# Patient Record
Sex: MALE | Race: BLACK OR AFRICAN AMERICAN | NOT HISPANIC OR LATINO
[De-identification: names, ages, dates, MRNs, and addresses within clinical notes are randomized per-mention and may not be internally consistent; named-entity substitution may affect disease eponyms.]

---

## 2017-01-16 ENCOUNTER — RECORD ABSTRACTING (OUTPATIENT)
Age: 62
End: 2017-01-16

## 2017-01-16 DIAGNOSIS — F19.21 OTHER PSYCHOACTIVE SUBSTANCE DEPENDENCE, IN REMISSION: ICD-10-CM

## 2017-01-16 DIAGNOSIS — Z87.891 PERSONAL HISTORY OF NICOTINE DEPENDENCE: ICD-10-CM

## 2017-01-16 DIAGNOSIS — F25.8 OTHER SCHIZOAFFECTIVE DISORDERS: ICD-10-CM

## 2017-01-16 DIAGNOSIS — Z78.9 OTHER SPECIFIED HEALTH STATUS: ICD-10-CM

## 2017-01-16 DIAGNOSIS — Z87.898 PERSONAL HISTORY OF OTHER SPECIFIED CONDITIONS: ICD-10-CM

## 2017-01-17 ENCOUNTER — APPOINTMENT (OUTPATIENT)
Dept: PULMONOLOGY | Facility: CLINIC | Age: 62
End: 2017-01-17

## 2017-01-17 VITALS — BODY MASS INDEX: 29.1 KG/M2 | WEIGHT: 201 LBS | HEIGHT: 69.5 IN

## 2017-01-17 VITALS — OXYGEN SATURATION: 95 % | SYSTOLIC BLOOD PRESSURE: 138 MMHG | DIASTOLIC BLOOD PRESSURE: 80 MMHG | HEART RATE: 75 BPM

## 2017-01-17 VITALS — RESPIRATION RATE: 16 BRPM

## 2017-01-17 DIAGNOSIS — Z86.79 PERSONAL HISTORY OF OTHER DISEASES OF THE CIRCULATORY SYSTEM: ICD-10-CM

## 2017-01-17 DIAGNOSIS — Z86.19 PERSONAL HISTORY OF OTHER INFECTIOUS AND PARASITIC DISEASES: ICD-10-CM

## 2017-01-17 DIAGNOSIS — Z00.00 ENCOUNTER FOR GENERAL ADULT MEDICAL EXAMINATION W/OUT ABNORMAL FINDINGS: ICD-10-CM

## 2017-01-17 DIAGNOSIS — F17.200 NICOTINE DEPENDENCE, UNSPECIFIED, UNCOMPLICATED: ICD-10-CM

## 2017-01-17 DIAGNOSIS — Z86.59 PERSONAL HISTORY OF OTHER MENTAL AND BEHAVIORAL DISORDERS: ICD-10-CM

## 2017-01-17 DIAGNOSIS — Z01.811 ENCOUNTER FOR PREPROCEDURAL RESPIRATORY EXAMINATION: ICD-10-CM

## 2017-01-17 RX ORDER — DILTIAZEM HYDROCHLORIDE 180 MG/1
180 CAPSULE, COATED, EXTENDED RELEASE ORAL
Refills: 0 | Status: ACTIVE | COMMUNITY

## 2017-01-17 RX ORDER — ALBUTEROL SULFATE 90 UG/1
108 (90 BASE) AEROSOL, METERED RESPIRATORY (INHALATION)
Refills: 0 | Status: ACTIVE | COMMUNITY

## 2017-01-17 RX ORDER — METFORMIN HYDROCHLORIDE 500 MG/1
500 TABLET, COATED ORAL
Refills: 0 | Status: ACTIVE | COMMUNITY

## 2017-01-17 RX ORDER — CITALOPRAM 20 MG/1
20 TABLET, FILM COATED ORAL
Refills: 0 | Status: ACTIVE | COMMUNITY

## 2017-01-17 RX ORDER — GABAPENTIN 100 MG/1
100 CAPSULE ORAL
Refills: 0 | Status: ACTIVE | COMMUNITY

## 2017-01-17 RX ORDER — CLONAZEPAM 0.5 MG/1
0.5 TABLET ORAL
Refills: 0 | Status: ACTIVE | COMMUNITY

## 2017-01-17 RX ORDER — METOPROLOL SUCCINATE 50 MG/1
50 TABLET, EXTENDED RELEASE ORAL
Refills: 0 | Status: ACTIVE | COMMUNITY

## 2017-03-30 ENCOUNTER — OUTPATIENT (OUTPATIENT)
Dept: OUTPATIENT SERVICES | Facility: HOSPITAL | Age: 62
LOS: 1 days | End: 2017-03-30
Payer: MEDICARE

## 2017-03-30 ENCOUNTER — APPOINTMENT (OUTPATIENT)
Dept: RADIOLOGY | Facility: CLINIC | Age: 62
End: 2017-03-30

## 2017-03-30 DIAGNOSIS — J44.9 CHRONIC OBSTRUCTIVE PULMONARY DISEASE, UNSPECIFIED: ICD-10-CM

## 2017-03-30 PROCEDURE — 71046 X-RAY EXAM CHEST 2 VIEWS: CPT

## 2017-04-20 ENCOUNTER — APPOINTMENT (OUTPATIENT)
Dept: PULMONOLOGY | Facility: CLINIC | Age: 62
End: 2017-04-20

## 2017-04-20 VITALS — HEART RATE: 96 BPM | DIASTOLIC BLOOD PRESSURE: 88 MMHG | OXYGEN SATURATION: 95 % | SYSTOLIC BLOOD PRESSURE: 120 MMHG

## 2017-04-20 VITALS — BODY MASS INDEX: 28.24 KG/M2 | WEIGHT: 194 LBS

## 2017-04-20 VITALS — RESPIRATION RATE: 16 BRPM

## 2017-04-20 RX ORDER — ENOXAPARIN SODIUM 120 MG/.8ML
120 INJECTION SUBCUTANEOUS
Refills: 0 | Status: DISCONTINUED | COMMUNITY
End: 2017-04-20

## 2017-04-20 RX ORDER — METOPROLOL SUCCINATE 100 MG/1
100 TABLET, EXTENDED RELEASE ORAL
Qty: 30 | Refills: 0 | Status: DISCONTINUED | COMMUNITY
Start: 2016-12-27 | End: 2017-04-20

## 2017-04-20 RX ORDER — WARFARIN 10 MG/1
10 TABLET ORAL
Qty: 30 | Refills: 0 | Status: ACTIVE | COMMUNITY
Start: 2016-12-27

## 2017-04-20 RX ORDER — OXYCODONE HYDROCHLORIDE AND ACETAMINOPHEN 10; 325 MG/1; MG/1
TABLET ORAL
Refills: 0 | Status: DISCONTINUED | COMMUNITY
End: 2017-04-20

## 2017-04-20 RX ORDER — KETOCONAZOLE 20 MG/G
2 CREAM TOPICAL
Qty: 30 | Refills: 0 | Status: ACTIVE | COMMUNITY
Start: 2016-11-17

## 2017-04-20 RX ORDER — CLONAZEPAM 1 MG/1
1 TABLET ORAL
Qty: 60 | Refills: 0 | Status: DISCONTINUED | COMMUNITY
Start: 2016-11-02 | End: 2017-04-20

## 2017-04-20 RX ORDER — OXYCODONE AND ACETAMINOPHEN 5; 325 MG/1; MG/1
5-325 TABLET ORAL
Qty: 60 | Refills: 0 | Status: ACTIVE | COMMUNITY
Start: 2016-10-25

## 2017-05-29 ENCOUNTER — OUTPATIENT (OUTPATIENT)
Dept: OUTPATIENT SERVICES | Facility: HOSPITAL | Age: 62
LOS: 1 days | End: 2017-05-29
Payer: MEDICARE

## 2017-05-29 ENCOUNTER — APPOINTMENT (OUTPATIENT)
Dept: CT IMAGING | Facility: CLINIC | Age: 62
End: 2017-05-29

## 2017-05-29 DIAGNOSIS — Z00.8 ENCOUNTER FOR OTHER GENERAL EXAMINATION: ICD-10-CM

## 2017-05-29 PROCEDURE — 71250 CT THORAX DX C-: CPT

## 2017-06-08 ENCOUNTER — APPOINTMENT (OUTPATIENT)
Dept: PULMONOLOGY | Facility: CLINIC | Age: 62
End: 2017-06-08

## 2017-06-08 VITALS
OXYGEN SATURATION: 96 % | HEART RATE: 86 BPM | BODY MASS INDEX: 29.11 KG/M2 | WEIGHT: 200 LBS | DIASTOLIC BLOOD PRESSURE: 70 MMHG | SYSTOLIC BLOOD PRESSURE: 120 MMHG

## 2017-06-08 VITALS — RESPIRATION RATE: 16 BRPM

## 2017-12-05 ENCOUNTER — APPOINTMENT (OUTPATIENT)
Dept: PULMONOLOGY | Facility: CLINIC | Age: 62
End: 2017-12-05

## 2018-09-18 ENCOUNTER — APPOINTMENT (OUTPATIENT)
Dept: PULMONOLOGY | Facility: CLINIC | Age: 63
End: 2018-09-18
Payer: MEDICARE

## 2018-09-18 VITALS — RESPIRATION RATE: 16 BRPM

## 2018-09-18 VITALS
WEIGHT: 198 LBS | OXYGEN SATURATION: 95 % | HEIGHT: 69 IN | HEART RATE: 85 BPM | SYSTOLIC BLOOD PRESSURE: 122 MMHG | DIASTOLIC BLOOD PRESSURE: 64 MMHG | BODY MASS INDEX: 29.33 KG/M2

## 2018-09-18 PROCEDURE — 99214 OFFICE O/P EST MOD 30 MIN: CPT | Mod: 25

## 2018-09-18 PROCEDURE — 94010 BREATHING CAPACITY TEST: CPT

## 2018-09-18 RX ORDER — TAMSULOSIN HYDROCHLORIDE 0.4 MG/1
0.4 CAPSULE ORAL
Refills: 0 | Status: DISCONTINUED | COMMUNITY
End: 2018-09-18

## 2019-05-16 ENCOUNTER — APPOINTMENT (OUTPATIENT)
Dept: ORTHOPEDIC SURGERY | Facility: CLINIC | Age: 64
End: 2019-05-16

## 2019-06-15 ENCOUNTER — APPOINTMENT (OUTPATIENT)
Dept: ORTHOPEDIC SURGERY | Facility: CLINIC | Age: 64
End: 2019-06-15
Payer: MEDICARE

## 2019-06-15 ENCOUNTER — OTHER (OUTPATIENT)
Age: 64
End: 2019-06-15

## 2019-06-15 VITALS
WEIGHT: 203 LBS | HEIGHT: 69 IN | BODY MASS INDEX: 30.07 KG/M2 | SYSTOLIC BLOOD PRESSURE: 124 MMHG | HEART RATE: 105 BPM | DIASTOLIC BLOOD PRESSURE: 78 MMHG

## 2019-06-15 DIAGNOSIS — Z87.09 PERSONAL HISTORY OF OTHER DISEASES OF THE RESPIRATORY SYSTEM: ICD-10-CM

## 2019-06-15 DIAGNOSIS — Z85.79 PERSONAL HISTORY OF OTHER MALIGNANT NEOPLASMS OF LYMPHOID, HEMATOPOIETIC AND RELATED TISSUES: ICD-10-CM

## 2019-06-15 DIAGNOSIS — Z80.9 FAMILY HISTORY OF MALIGNANT NEOPLASM, UNSPECIFIED: ICD-10-CM

## 2019-06-15 DIAGNOSIS — Z86.39 PERSONAL HISTORY OF OTHER ENDOCRINE, NUTRITIONAL AND METABOLIC DISEASE: ICD-10-CM

## 2019-06-15 DIAGNOSIS — M24.812 OTHER SPECIFIC JOINT DERANGEMENTS OF LEFT SHOULDER, NOT ELSEWHERE CLASSIFIED: ICD-10-CM

## 2019-06-15 DIAGNOSIS — Z87.39 PERSONAL HISTORY OF OTHER DISEASES OF THE MUSCULOSKELETAL SYSTEM AND CONNECTIVE TISSUE: ICD-10-CM

## 2019-06-15 DIAGNOSIS — Z86.79 PERSONAL HISTORY OF OTHER DISEASES OF THE CIRCULATORY SYSTEM: ICD-10-CM

## 2019-06-15 DIAGNOSIS — Z82.61 FAMILY HISTORY OF ARTHRITIS: ICD-10-CM

## 2019-06-15 DIAGNOSIS — G89.29 PAIN IN LEFT SHOULDER: ICD-10-CM

## 2019-06-15 DIAGNOSIS — M25.512 PAIN IN LEFT SHOULDER: ICD-10-CM

## 2019-06-15 DIAGNOSIS — Z85.46 PERSONAL HISTORY OF MALIGNANT NEOPLASM OF PROSTATE: ICD-10-CM

## 2019-06-15 PROCEDURE — 99204 OFFICE O/P NEW MOD 45 MIN: CPT

## 2019-06-15 NOTE — DISCUSSION/SUMMARY
[de-identified] : Assessment: Left shoulder RCT.\par \par Plan:\par At this point in time I would like to go ahead an order an MRI without contrast left shoulder to r/o RCT.  I have a high suspicion based on x-ray that he has a full-thickness RCT.  Post MRI we will call him with the results and most likely have him follow-up with a Sports attending.  All of his questions were answered and he is on board with this treatment plan.

## 2019-06-15 NOTE — HISTORY OF PRESENT ILLNESS
[de-identified] : Nicolás is here for his left shoulder chronic pain.  He is currently in pain management for his shoulder but the left is most bothersome today.   He is in physical therapy at the time but states that his pain has increased during physical therapy.  His therapist was worried about his left shoulder having a rotator cuff tear which is why he also presents in office today.  He states that he cannot raise his left shoulder and has weakness of the left shoulder with pain.  Left shoulder pain scale today is 2/10 at worst 8/10.  He states that he has night pain daily and the shoulder pain can get so bad that it prevents him from sleeping at night.  He denies numbness and tingling down his arm.

## 2019-06-15 NOTE — PHYSICAL EXAM
[de-identified] : Left Shoulder:\par \par General: Alert and oriented x3.  In no acute distress.  Pleasant in nature with a normal affect.  No apparent respiratory distress. \par Erythema, Warmth, Rubor: Negative\par Swelling: Negative.  Palpable possible lipoma (nontender) anterior aspect of shoulder measuring approx. 5cm x 3cm.  \par \par ROM:\par FF: 30 degrees actively/PROM to 130 with pain. \par ER: 45 degrees\par Abduction: 30 degrees actively/PROM 90 with pain\par IR: Normal\par IR behind back: Not tested due to pain. \par \par Special Test:\par Empty Can Sign: Positive\par Haigler's Sign: Negative\par Sosa/Neer Sign: Negative\par Speed's Sign: Negative\par Yergason's Sign: Negative\par Apprehension/Relocation: Negative\par Sulcus Sign: Negative\par Cross Arm Adduction/Pain over AC-J: Negative\par Belly Press/Lift Off Behind Back: Negative\par \par Neurovascularly intact motor and sensory\par \par *Shoulder hike at rest. [de-identified] : 3 views of the left shoulder show a high-riding humerus/GH-J OA.

## 2019-07-18 ENCOUNTER — APPOINTMENT (OUTPATIENT)
Dept: GASTROENTEROLOGY | Facility: CLINIC | Age: 64
End: 2019-07-18
Payer: MEDICARE

## 2019-07-18 VITALS
HEART RATE: 86 BPM | DIASTOLIC BLOOD PRESSURE: 82 MMHG | RESPIRATION RATE: 18 BRPM | HEIGHT: 69 IN | OXYGEN SATURATION: 95 % | BODY MASS INDEX: 30.36 KG/M2 | WEIGHT: 205 LBS | SYSTOLIC BLOOD PRESSURE: 128 MMHG

## 2019-07-18 DIAGNOSIS — D64.9 ANEMIA, UNSPECIFIED: ICD-10-CM

## 2019-07-18 DIAGNOSIS — Z12.11 ENCOUNTER FOR SCREENING FOR MALIGNANT NEOPLASM OF COLON: ICD-10-CM

## 2019-07-18 DIAGNOSIS — Z86.010 PERSONAL HISTORY OF COLONIC POLYPS: ICD-10-CM

## 2019-07-18 DIAGNOSIS — K92.1 MELENA: ICD-10-CM

## 2019-07-18 PROCEDURE — 99203 OFFICE O/P NEW LOW 30 MIN: CPT

## 2019-07-18 NOTE — ASSESSMENT
[FreeTextEntry1] : this is a 64-year-old man with new-onset iron deficiency anemia. He had blood in his stool at his PCPs office. He also has a history of colon polyps. I will schedule him for an EGD and colonoscopy

## 2019-07-18 NOTE — CONSULT LETTER
[Dear  ___] : Dear  [unfilled], [Consult Letter:] : I had the pleasure of evaluating your patient, [unfilled]. [Please see my note below.] : Please see my note below. [Sincerely,] : Sincerely, [FreeTextEntry3] : Donny Feliciano M.D.

## 2019-07-18 NOTE — HISTORY OF PRESENT ILLNESS
[de-identified] : Is a 64-year-old male, who presents for new onset iron deficiency anemia and blood in his stool. He denies any overt bleeding. His primary care doctor recently sent in for blood tests and found to be anemic. He said about a year ago. He was found to be anemic and placed on iron. He stopped that. Iron 6 months ago. There is never any cause found. He denies any recent EGDs and colonoscopies. He did have a gastroenterologist at [facility] who did an EGD and colonoscopy 5 or more years ago. He denies any recent NSAID use. He denies any weight loss. he has a history of A. fib. and is on coumadin.

## 2019-07-24 ENCOUNTER — MEDICATION RENEWAL (OUTPATIENT)
Age: 64
End: 2019-07-24

## 2019-07-30 ENCOUNTER — MEDICATION RENEWAL (OUTPATIENT)
Age: 64
End: 2019-07-30

## 2019-07-30 DIAGNOSIS — K21.9 GASTRO-ESOPHAGEAL REFLUX DISEASE W/OUT ESOPHAGITIS: ICD-10-CM

## 2019-09-04 ENCOUNTER — APPOINTMENT (OUTPATIENT)
Dept: GASTROENTEROLOGY | Facility: HOSPITAL | Age: 64
End: 2019-09-04

## 2019-10-18 ENCOUNTER — MEDICATION RENEWAL (OUTPATIENT)
Age: 64
End: 2019-10-18

## 2019-10-29 ENCOUNTER — APPOINTMENT (OUTPATIENT)
Dept: ORTHOPEDIC SURGERY | Facility: CLINIC | Age: 64
End: 2019-10-29
Payer: MEDICARE

## 2019-10-29 DIAGNOSIS — M62.58 MUSCLE WASTING AND ATROPHY, NOT ELSEWHERE CLASSIFIED, OTHER SITE: ICD-10-CM

## 2019-10-29 PROCEDURE — 99214 OFFICE O/P EST MOD 30 MIN: CPT

## 2019-10-29 NOTE — HISTORY OF PRESENT ILLNESS
[FreeTextEntry1] : 10/29/2019: The patient is a 64-year-old male who presents to the office today with weakness and pain to the left shoulder. His symptoms have been going on for a few years. He does note that he has neck pain. He has a history of range of motion loss associated with weakness of the shoulder that is worsening. He was being seen by a physical therapist this spring who is also concerned about his weakness. He was sent by another orthopedist for a MRI of the left shoulder. The patient presents today with difficulty lifting the arm associated with pain that is sharp in nature and located posteriorly. He denies any upper extremity paresthesias.

## 2019-10-29 NOTE — ASSESSMENT
[FreeTextEntry1] : Patient with severe range of motion limitation of the left shoulder with forward flexion, abduction, internal and external rotation. He has significant muscle atrophy at the posterior aspect of the shoulder. This is associated with weakness of the left upper extremity. I recommend a physiatry and neurology consultation. I also recommend an MRI of the cervical spine as well as an EMG to rule out any cervical spine pathology as the etiology of his posterior shoulder muscle atrophy and loss of left upper extremity strength. This was described at length with the patient he verbalized understanding.

## 2019-10-29 NOTE — PHYSICAL EXAM
[Normal LUE] : Left Upper Extremity: No scars, rashes, lesions, ulcers, skin intact [Normal] : No costovertebral angle tenderness and no spinal tenderness [de-identified] : Left Shoulder Exam\par \par Inspection: No malalignment, No defects\par Skin: No masses, No lesions. There is significant muscle atrophy in the posterior aspect of the shoulder\par Neck: Negative Spurlings, full ROM without pain\par ROM: Patient with severely limited range of motion with forward flexion, abduction, internal and external rotation of the left shoulder\par Painful arc ROM: None\par Tenderness: No bicipital tenderness, no tenderness to the greater tuberosity/RTC insertion, no anterior shoulder/lesser tuberosity tenderness\par Strength: 3/5 ER, 3/5 IR in adduction, 3/5 supraspinatus testing\par AC Joint: No ttp, no pain with cross arm testing\par Biceps: Speed negative, Yergusons negative\par Impingement test: Negative Sosa\par Stability: Negative apprehension, negative anterior/posterior load and shift\par Vasc: 2+ radial pulse\par Neuro: AIN, PIN, Ulnar nerve intact to motor\par Sensation: Intact to light touch throughout [de-identified] : ZANER [de-identified] : MRI of the left shoulder was reviewed with the patient. Patient with mild tendinosis of the rotator cuff.

## 2019-10-29 NOTE — REVIEW OF SYSTEMS
[Negative] : Allergic/Immunologic [FreeTextEntry9] : Left shoulder pain/left upper extremity weakness

## 2019-11-11 ENCOUNTER — FORM ENCOUNTER (OUTPATIENT)
Age: 64
End: 2019-11-11

## 2019-11-12 ENCOUNTER — OUTPATIENT (OUTPATIENT)
Dept: OUTPATIENT SERVICES | Facility: HOSPITAL | Age: 64
LOS: 1 days | End: 2019-11-12

## 2019-11-12 ENCOUNTER — APPOINTMENT (OUTPATIENT)
Dept: MRI IMAGING | Facility: CLINIC | Age: 64
End: 2019-11-12
Payer: MEDICARE

## 2019-11-12 DIAGNOSIS — M25.512 PAIN IN LEFT SHOULDER: ICD-10-CM

## 2019-11-12 PROCEDURE — 72141 MRI NECK SPINE W/O DYE: CPT | Mod: 26

## 2019-11-18 ENCOUNTER — APPOINTMENT (OUTPATIENT)
Dept: GASTROENTEROLOGY | Facility: HOSPITAL | Age: 64
End: 2019-11-18

## 2019-12-02 ENCOUNTER — APPOINTMENT (OUTPATIENT)
Dept: NEUROLOGY | Facility: CLINIC | Age: 64
End: 2019-12-02

## 2019-12-31 ENCOUNTER — APPOINTMENT (OUTPATIENT)
Dept: NEUROLOGY | Facility: CLINIC | Age: 64
End: 2019-12-31
Payer: MEDICARE

## 2019-12-31 PROCEDURE — 95886 MUSC TEST DONE W/N TEST COMP: CPT

## 2019-12-31 PROCEDURE — 95909 NRV CNDJ TST 5-6 STUDIES: CPT

## 2019-12-31 RX ORDER — FLUTICASONE PROPIONATE AND SALMETEROL 50; 500 UG/1; UG/1
500-50 POWDER RESPIRATORY (INHALATION)
Refills: 0 | Status: DISCONTINUED | COMMUNITY
End: 2019-12-31

## 2020-03-02 ENCOUNTER — APPOINTMENT (OUTPATIENT)
Dept: ORTHOPEDIC SURGERY | Facility: CLINIC | Age: 65
End: 2020-03-02
Payer: MEDICARE

## 2020-03-02 VITALS
DIASTOLIC BLOOD PRESSURE: 87 MMHG | SYSTOLIC BLOOD PRESSURE: 123 MMHG | HEIGHT: 70 IN | HEART RATE: 94 BPM | WEIGHT: 191 LBS | BODY MASS INDEX: 27.35 KG/M2

## 2020-03-02 DIAGNOSIS — M67.912 UNSPECIFIED DISORDER OF SYNOVIUM AND TENDON, LEFT SHOULDER: ICD-10-CM

## 2020-03-02 DIAGNOSIS — M19.012 PRIMARY OSTEOARTHRITIS, LEFT SHOULDER: ICD-10-CM

## 2020-03-02 DIAGNOSIS — M75.82 OTHER SHOULDER LESIONS, LEFT SHOULDER: ICD-10-CM

## 2020-03-02 PROCEDURE — 99214 OFFICE O/P EST MOD 30 MIN: CPT

## 2020-03-02 NOTE — HISTORY OF PRESENT ILLNESS
[Worsening] : worsening [___ yrs] : [unfilled] year(s) ago [4] : a current pain level of 4/10 [9] : a maximum pain level of 9/10 [Intermit.] : ~He/She~ states the symptoms seem to be intermittent [Lifting] : worsened by lifting [de-identified] : JUAN is a 64 year old male who presents today for initial evaluation of left shoulder pain.  He previously completed physical therapy.  He is under the care of a pain management doctor, Maria G Jordan MD.  He lives at [facility]. He comes in with MRI results that reveals atrophy to his rotator cuff muscles without tear. [de-identified] : medication

## 2020-03-02 NOTE — PHYSICAL EXAM
[de-identified] : Physical Exam:\par General: Well appearing, no acute distress, A&Ox3\par Neurologic: No focal deficits\par Head: NCAT without abrasions, lacerations, or ecchymosis to head, face, or scalp\par Eyes: No scleral icterus, no gross abnormalities\par Respiratory: Equal chest wall expansion bilaterally, no accessory muscle use\par Lymphatic: No lymphadenopathy palpated\par Skin: Warm and dry\par Psychiatric: Normal mood and affect\par \par Left Shoulder\par ·	Inspection/Palpation: No tenderness, no crepitus, no deformities\par ·	Range of Motion: no crepitus with ROM; Active ; ER at side 35; IR to L1; Passive  ; ER at side 45; IR to L1\par ·	Strength: forward elevation in scapular plane [4/5], internal rotation [4/5], external rotation [4/5], adduction [4/5] and abduction [4/5]\par ·	Stability: no joint instability on provocative testing\par ·	Tests: Sosa test positive, Neer positive, positive drop arm test secondary to pain, bear hug test positive, Napolean sign POS, cross arm adduction positive, lift off sign positive, hornblowers sign POS, speeds test negative, Yergason's test negative, Delacruz's Active Compression test negative, whipple test positive, bicep's load II test negative\par \par Right Shoulder\par ·	Inspection/Palpation: no tenderness, swelling or deformities\par ·	Range of Motion: full and painless in all planes, no crepitus\par ·	Strength: forward elevation in scapular plane 5/5, internal rotation 5/5, external rotation 5/5, adduction 5/5 and abduction 5/5\par ·	Stability: no joint instability on provocative testing\par Tests: Sosa test negative, Neer sign negative, negative drop arm test secondary to pain, bear hug test negative, Napolean sign negative, cross arm adduction negative, lift off sign positive, hornblowers sign negative, speeds test negative, Yergason's test negative, no bicipital groove tenderness, Delacruz's Active Compression test negative [de-identified] : MRI results from stand up MRI reveals atrophy to his rotator cuff muscles without tear. Mild GH arthritis and AC joint impingement.

## 2020-03-02 NOTE — REVIEW OF SYSTEMS
[Urinary Urgency] : urinary urgency [Headache] : headache [Negative] : Heme/Lymph [FreeTextEntry9] : left shoulder

## 2020-03-02 NOTE — DISCUSSION/SUMMARY
[de-identified] : JUAN is a 64 year old male who presents today for initial evaluation of left shoulder pain.  He previously completed physical therapy.  He is under the care of a pain management doctor, Maria G Jordan MD.  He lives at [facility]. He comes in with MRI results that reveals atrophy to his rotator cuff muscles without tear.\par \par After review of pts normal radiographs and his clinical exam I believe his primary complaint to be RTC tenndinopathy of which conservative measures are indicated. Pt to perform PT with a therapist we recommend 2x/week x 6-8 weeks and until we see him again. At that time we will see him for clinical reassessment. Pt agrees to the above plan and all questions were answered.

## 2020-05-28 ENCOUNTER — APPOINTMENT (OUTPATIENT)
Dept: NEUROSURGERY | Facility: CLINIC | Age: 65
End: 2020-05-28
Payer: MEDICARE

## 2020-05-28 VITALS
HEIGHT: 70 IN | OXYGEN SATURATION: 95 % | DIASTOLIC BLOOD PRESSURE: 62 MMHG | HEART RATE: 103 BPM | TEMPERATURE: 98.3 F | WEIGHT: 190 LBS | SYSTOLIC BLOOD PRESSURE: 97 MMHG | BODY MASS INDEX: 27.2 KG/M2

## 2020-05-28 PROCEDURE — 99203 OFFICE O/P NEW LOW 30 MIN: CPT

## 2020-05-28 RX ORDER — FUROSEMIDE 40 MG/1
40 TABLET ORAL
Refills: 0 | Status: ACTIVE | COMMUNITY

## 2020-05-28 RX ORDER — CARVEDILOL 12.5 MG/1
12.5 TABLET, FILM COATED ORAL
Refills: 0 | Status: ACTIVE | COMMUNITY

## 2020-05-28 RX ORDER — CHLORHEXIDINE GLUCONATE 4 %
325 (65 FE) LIQUID (ML) TOPICAL
Refills: 0 | Status: ACTIVE | COMMUNITY

## 2020-05-29 NOTE — REASON FOR VISIT
[New Patient Visit] : a new patient visit [Referred By: _________] : Patient was referred by NIKKI [FreeTextEntry1] : cervical radiculopathy

## 2020-05-29 NOTE — ASSESSMENT
[FreeTextEntry1] : Mr. Davis presents for initial neurosurgical evaluation with history and imaging findings as above.  He has symptoms referable to his cervical DDD.  The patient wishes to try conservative therapy/PT.  Ultimately, he would likely be an appropriate candidate for a C3-4, 4-5, 5-6 ACDF should he fail to improve with conservative therapy.  I will see the patient back in 1 month.  He knows to call the office with any new or concerning symptoms in the interim.

## 2020-05-29 NOTE — HISTORY OF PRESENT ILLNESS
[> 3 months] : more  than 3 months [Pain] : pain [Numbness] : numbness [Weakness] : weakness [Worsening] : worsening [___ yrs] : [unfilled] year(s) ago [Bending] : worsened by bending [Lifting] : worsened by lifting [Walking] : worsened by walking [Rest] : relieved by rest [Recumbency] : relieved by recumbency [Incontinence] : incontinence [Loss of Dexterity] : loss of dexterity [FreeTextEntry1] : neck and bilateral UE pain L > R.  [de-identified] : JUAN ALEMAN is a 64 year old  right hand dominant male presents for initial neurosurgical evaluation of cervical radiculopathy. PMH includes RA,  COPD, HLD,  Hodgkin's lymphoma and prostate CA.  S/p prostatectomy 2011.  He mentions his symptoms started about 3 years ago and his condition is gradually worsening.  He denies any trauma or injury.  Patient endorses neck/interscapular pain with intermittent radiation to bilateral upper extremities L > R. Patient is unable to raise left arm past shoulder height. neck pain = UE pain. L > R UE pain.  The pain is predominantly in the shoulder region.  Pain intensity 6/10.  Denies any saddle anesthesias, intermittent urinary incontinence secondary to prostatectomy.  He is ambulating independently.  No balance issues.  Patient states the pain is aggravated by lifting, and grabbing objects.  It is improved with rest and modification of activities.  He has not tried physical therapy as of yet secondary to COVID.  Patient is under the care of Dr. Jordan, pain management.  Past treatments include intra articular injections to the bilateral shoulders which assist with pain and mobility.  He manages his symptoms with Baclofen and Oxycodone as needed.  He states he had a UE EMG/NCS from 12/2019 reveals mild left carpal tunnel syndrome.  Decreased recruitment of left arm proximally suggestive of chronic C5- C6 radiculopathy.\par  [Ataxia] : no ataxia [Urinary Ret.] : no urinary retention

## 2020-05-29 NOTE — DATA REVIEWED
[de-identified] :  EXAM: MR SPINE CERVICAL    PROCEDURE DATE: 11/12/2019     INTERPRETATION:  CERVICAL SPINE MRI   CLINICAL INFORMATION: Neck pain, posterior shoulder muscle atrophy   TECHNIQUE: Multiplanar, multisequence MRI was obtained of the cervical spine.   Comparison: None   FINDINGS:   DISC LEVEL EVALUATION:   C1/2: Normal  C2/C3: Small generalized disc osteophyte complex. Advanced degeneration of  the left facet joint and mild right facet joint arthropathy. There is severe  narrowing of the left neural foramen, mild to moderate right foraminal  encroachment, and low-grade canal stenosis.  C3/C4: Diffuse disc osteophyte complex asymmetric to the left, minimally  impresses the ventral aspect of the cervical spinal cord. Moderate bilateral  facet arthropathy. There is moderate canal stenosis, severe narrowing of the  left neural foramen, and mild to moderate right foraminal encroachment.  C4/C5: Trace retrolisthesis of C4 on C5. Diffuse disc osteophyte complex  which impresses and slightly flattens the cervical spinal cord. Moderate  bilateral facet arthropathy. There is moderate-severe canal stenosis and  severe bilateral foraminal narrowing.  C5/C6: Trace retrolisthesis of C5 on C6. Moderate generalized disc  osteophyte complex. Mild degeneration of both facet joints. There is  moderate canal stenosis and severe bilateral foraminal narrowing.  C6/C7: Trace disc bulge. Early degeneration of both facet joints.  Mild-oderate canal stenosis, moderate narrowing of the right neural foramen,  and mild left foraminal encroachment.  C7/T1: Minimal disc bulge. Moderate degeneration of both facet joints. There  is mild canal stenosis, moderate narrowing of the right neural foramen, and  minimal left foraminal stenosis.   ALIGNMENT: Trace retrolisthesis of C4 on C5 and C5 on C6.  CORD: Disc bulges at C3-C4 and C4-C5 impresses slightly deforms the cervical  spinal cord. Suspected low-grade associated cord edema at the C4-C5 level  (series 5 image 11, series 7 image 11)  MARROW: Mild reactive endplate marrow change at C4-C5. Anterior marginal  spurring at each level from C3 to C6.  IMAGED BRAIN: Unremarkable  PERIPHERAL/NECK SOFT TISSUES: Unremarkable    IMPRESSION:   Multilevel degenerative spondylosis/disc disease most advanced at C3-C4,  C4-C5, and C5-C6. Largest disc bulge at C4-C5 which flattens the cervical  spinal cord, resulting in moderate-severe canal stenosis and high-grade  bilateral foraminal narrowing; suspected faint associated cord edema at this  level.   Moderate canal stenosis at C3-C4 and C5-C6.   Severe foraminal narrowing on the left at C2-C3 and C3-C4, and bilaterally  at C5-C6.

## 2020-05-29 NOTE — REVIEW OF SYSTEMS
[As Noted in HPI] : as noted in HPI [Numbness] : numbness [Tingling] : tingling [Negative] : Heme/Lymph [Feeling Tired] : feeling tired [FreeTextEntry9] : neck and UE pain

## 2020-05-29 NOTE — PHYSICAL EXAM
[General Appearance - Alert] : alert [General Appearance - In No Acute Distress] : in no acute distress [General Appearance - Well Nourished] : well nourished [General Appearance - Well Developed] : well developed [Oriented To Time, Place, And Person] : oriented to person, place, and time [Impaired Insight] : insight and judgment were intact [Person] : oriented to person [Place] : oriented to place [Time] : oriented to time [Short Term Intact] : short term memory intact [Fluency] : fluency intact [Concentration Intact] : normal concentrating ability [Comprehension] : comprehension intact [Cranial Nerves Trigeminal (V)] : facial sensation intact symmetrically [Cranial Nerves Facial (VII)] : face symmetrical [Cranial Nerves Accessory (XI - Cranial And Spinal)] : head turning and shoulder shrug symmetric [Cranial Nerves Glossopharyngeal (IX)] : tongue and palate midline [Cranial Nerves Hypoglossal (XII)] : there was no tongue deviation with protrusion [Motor Tone] : muscle tone was normal in all four extremities [Motor Strength] : muscle strength was normal in all four extremities [Over the Past 2 Weeks, Have You Felt Down, Depressed, or Hopeless?] : 1.) Over the past 2 weeks, have you felt down, depressed, or hopeless? No [Over the Past 2 Weeks, Have You Felt Little Interest or Pleasure Doing Things?] : 2.) Over the past 2 weeks, have you felt little interest or pleasure doing things? No [Cranial Nerves Optic (II)] : visual acuity intact bilaterally,  pupils equal round and reactive to light [Sensation Tactile Decrease] : light touch was intact [___] : absent on the right [___] : absent on the left [FreeTextEntry6] : UE and LE 5/5 bilaterally [FreeTextEntry8] : difficulty with tandem gait [FreeTextEntry9] : no Addison's bilaterally

## 2020-07-08 ENCOUNTER — APPOINTMENT (OUTPATIENT)
Dept: NEUROSURGERY | Facility: CLINIC | Age: 65
End: 2020-07-08

## 2020-08-14 ENCOUNTER — APPOINTMENT (OUTPATIENT)
Dept: NEUROSURGERY | Facility: CLINIC | Age: 65
End: 2020-08-14
Payer: MEDICARE

## 2020-08-14 VITALS
WEIGHT: 190 LBS | HEIGHT: 70 IN | DIASTOLIC BLOOD PRESSURE: 70 MMHG | HEART RATE: 80 BPM | BODY MASS INDEX: 27.2 KG/M2 | TEMPERATURE: 97.9 F | SYSTOLIC BLOOD PRESSURE: 110 MMHG | OXYGEN SATURATION: 97 %

## 2020-08-14 PROCEDURE — 99214 OFFICE O/P EST MOD 30 MIN: CPT

## 2020-08-14 NOTE — PHYSICAL EXAM
[General Appearance - Alert] : alert [General Appearance - In No Acute Distress] : in no acute distress [Oriented To Time, Place, And Person] : oriented to person, place, and time [Impaired Insight] : insight and judgment were intact [Mood] : the mood was normal [Person] : oriented to person [Place] : oriented to place [Time] : oriented to time [Short Term Intact] : short term memory intact [Concentration Intact] : normal concentrating ability [Fluency] : fluency intact [Comprehension] : comprehension intact [Current Events] : adequate knowledge of current events [Past History] : adequate knowledge of personal past history [Cranial Nerves Optic (II)] : visual acuity intact bilaterally,  pupils equal round and reactive to light [Cranial Nerves Oculomotor (III)] : extraocular motion intact [Cranial Nerves Trigeminal (V)] : facial sensation intact symmetrically [Cranial Nerves Facial (VII)] : face symmetrical [Cranial Nerves Vestibulocochlear (VIII)] : hearing was intact bilaterally [Cranial Nerves Glossopharyngeal (IX)] : tongue and palate midline [Cranial Nerves Accessory (XI - Cranial And Spinal)] : head turning and shoulder shrug symmetric [Cranial Nerves Hypoglossal (XII)] : there was no tongue deviation with protrusion [Motor Strength] : muscle strength was normal in all four extremities [Neck Appearance] : the appearance of the neck was normal [] : no respiratory distress [Heart Rate And Rhythm] : heart rate was normal and rhythm regular [Edema] : there was no peripheral edema [Skin Color & Pigmentation] : normal skin color and pigmentation [Sclera] : the sclera and conjunctiva were normal [Extraocular Movements] : extraocular movements were intact [Full Visual Field] : full visual field [FreeTextEntry6] : Left deltoid muscle wasting. UE and LE 5/5 bilaterally.  [FreeTextEntry1] : E

## 2020-08-14 NOTE — ASSESSMENT
[FreeTextEntry1] : Mr. Davis presents for neurosurgical evaluation with history and imaging findings suggestive of cervical DDD. Patient has tried conservative treatment with suboptimal results. His pain and decreased arm function continue to impair his quality of life. Of note, his reports preexisting difficulties with swallowing. \par We discussed risks, benefits and alternatives in treatment. It was explained that the goal of surgery is to prevent further loss of function of the arms and hopefully alleviate some pain. We discussed how it cannot be expected that all the pain will be alleviated or that all lost function will be regained. He would like to further discuss the option of surgery with Dr. Allen. All of patient's questions and concerns were addressed.

## 2020-08-14 NOTE — HISTORY OF PRESENT ILLNESS
[FreeTextEntry1] : Mr. Davis is a 65 year-old right handed man with PMH RA, COPD, HLD, Hodgkin's lymphoma and prostate CA s/p prostatectomy 2011. He presents today for neurosurgical evaluation of cervical radiculopathy. He mentions his symptoms started about three years ago and his condition has gradually worsened. He has pursued conservative treatment since his last visit such as PT, muscle relaxers and pain medication. He states that physical therapy has provided him about 40% improvement of his symptoms. He denies any trauma or injury. Patient endorses neck/interscapular pain with intermittent radiation to bilateral upper extremities, L > R. Patient is unable to raise left arm past shoulder height. neck pain = UE pain. L > R UE pain. The pain is predominantly in the shoulder region. Denies any saddle anesthesias, intermittent urinary incontinence secondary to prostatectomy. He is ambulating independently. No balance issues. Patient states the pain is aggravated by lifting, and grabbing objects. It is improved with rest and modification of activities. Patient is under the care of Dr. Jordan, pain management. Past treatments include intra articular injections to the bilateral shoulders which assist with pain and mobility. He manages his symptoms with Baclofen and Oxycodone as needed. He states he had a UE EMG/NCS from 12/2019 reveals mild left carpal tunnel syndrome. Decreased recruitment of left arm proximally suggestive of chronic C5- C6 radiculopathy.

## 2020-08-14 NOTE — REVIEW OF SYSTEMS
[Arm Weakness] : arm weakness [Numbness] : numbness [Tingling] : tingling [Negative] : Psychiatric [Incontinence] : incontinence [Fever] : no fever [Chills] : no chills [Confused or Disoriented] : no confusion [Decr. Concentrating Ability] : no decrease in concentrating ability [Changed Thought Patterns] : no change in thought patterns [Repeating Questions] : no repeated questioning about recent events [Facial Weakness] : no facial weakness [Hand Weakness] : no hand weakness [Leg Weakness] : no leg weakness [Poor Coordination] : good coordination [Chest Pain] : no chest pain [Shortness Of Breath] : no shortness of breath

## 2020-09-03 ENCOUNTER — APPOINTMENT (OUTPATIENT)
Dept: NEUROSURGERY | Facility: CLINIC | Age: 65
End: 2020-09-03
Payer: MEDICARE

## 2020-09-03 VITALS
BODY MASS INDEX: 26.77 KG/M2 | TEMPERATURE: 98.7 F | OXYGEN SATURATION: 99 % | HEART RATE: 85 BPM | SYSTOLIC BLOOD PRESSURE: 123 MMHG | HEIGHT: 70 IN | DIASTOLIC BLOOD PRESSURE: 73 MMHG | WEIGHT: 187 LBS

## 2020-09-03 PROCEDURE — 99214 OFFICE O/P EST MOD 30 MIN: CPT

## 2020-09-03 NOTE — REVIEW OF SYSTEMS
[Depression] : depression [Feeling Tired] : feeling tired [As Noted in HPI] : as noted in HPI [Tingling] : tingling [Numbness] : numbness [Negative] : Heme/Lymph

## 2020-10-13 NOTE — ASSESSMENT
[FreeTextEntry1] : Mr. Davis presents for follow-up with interval history as above.  He has long-standing UE weakness and does not wish to consider surgical intervention at this time.  I would be happy to see the patient back on a prn basis.

## 2020-10-13 NOTE — REASON FOR VISIT
[FreeTextEntry1] : Mr. Davis is a 65 year-old right handed man with PMH RA, COPD, HLD, Hodgkin's lymphoma and prostate CA s/p prostatectomy 2011. He presents today for neurosurgical evaluation of cervical radiculopathy and surgical discussion. He mentions his symptoms started about three years ago and his condition has gradually worsened. He has pursued conservative treatment since his last visit such as PT, muscle relaxers and pain medication. He states that physical therapy has provided him about 40% improvement of his symptoms. He denies any trauma or injury. Patient endorses neck/interscapular pain with intermittent radiation to bilateral upper extremities, L > R. Patient is unable to raise left arm past shoulder height. neck pain = UE pain. L > R UE pain. The pain is predominantly in the shoulder region. Denies any saddle anesthesias, intermittent urinary incontinence secondary to prostatectomy. He is ambulating independently. No balance issues. Patient states the pain is aggravated by lifting, and grabbing objects. It is improved with rest and modification of activities. Patient is under the care of Dr. Jordan, pain management. Past treatments include intra articular injections to the bilateral shoulders which assist with pain and mobility. He manages his symptoms with Baclofen and Oxycodone as needed. He states he had a UE EMG/NCS from 12/2019 reveals mild left carpal tunnel syndrome. Decreased recruitment of left arm proximally suggestive of chronic C5- C6 radiculopathy. \par

## 2020-10-13 NOTE — PHYSICAL EXAM
[General Appearance - Alert] : alert [General Appearance - In No Acute Distress] : in no acute distress [General Appearance - Well Nourished] : well nourished [General Appearance - Well Developed] : well developed [Oriented To Time, Place, And Person] : oriented to person, place, and time [Impaired Insight] : insight and judgment were intact [Place] : oriented to place [Person] : oriented to person [Time] : oriented to time [Short Term Intact] : short term memory intact [Concentration Intact] : normal concentrating ability [Fluency] : fluency intact [Comprehension] : comprehension intact [Cranial Nerves Trigeminal (V)] : facial sensation intact symmetrically [Cranial Nerves Glossopharyngeal (IX)] : tongue and palate midline [Cranial Nerves Facial (VII)] : face symmetrical [Cranial Nerves Hypoglossal (XII)] : there was no tongue deviation with protrusion [Cranial Nerves Accessory (XI - Cranial And Spinal)] : head turning and shoulder shrug symmetric [Motor Tone] : muscle tone was normal in all four extremities [Abnormal Walk] : normal gait [No Visual Abnormalities] : no visible abnormailities [Antalgic] : antalgic [Cranial Nerves Optic (II)] : visual acuity intact bilaterally,  pupils equal round and reactive to light [Cranial Nerves Oculomotor (III)] : extraocular motion intact [Sensation Tactile Decrease] : light touch was intact [Over the Past 2 Weeks, Have You Felt Down, Depressed, or Hopeless?] : 1.) Over the past 2 weeks, have you felt down, depressed, or hopeless? No [Over the Past 2 Weeks, Have You Felt Little Interest or Pleasure Doing Things?] : 2.) Over the past 2 weeks, have you felt little interest or pleasure doing things? No [___] : absent on the right [___] : absent on the left [FreeTextEntry6] : RUE and bilateral LE 5/5\par LUE with limited ROM at shoulder joint\par LUE deltoid with limited ROM at shoulder joint; 4/5\par LUE B T HG 5/5 [FreeTextEntry8] : antalgic gait [FreeTextEntry9] : no Addison's bilaterally

## 2020-10-13 NOTE — DATA REVIEWED
[de-identified] : CT of the cervical spine shows multilevel DDD and stenosis. [de-identified] : MRI of the cervical spine was reviewed along with the official report.  There is stenosis secondary to disk herniations at C3-4, 4-5, and 5-6 that are eccentric to the left.

## 2020-12-10 ENCOUNTER — APPOINTMENT (OUTPATIENT)
Dept: NEUROSURGERY | Facility: CLINIC | Age: 65
End: 2020-12-10
Payer: MEDICARE

## 2020-12-10 VITALS
DIASTOLIC BLOOD PRESSURE: 84 MMHG | SYSTOLIC BLOOD PRESSURE: 146 MMHG | OXYGEN SATURATION: 97 % | BODY MASS INDEX: 28.63 KG/M2 | HEART RATE: 95 BPM | HEIGHT: 70 IN | TEMPERATURE: 97.9 F | WEIGHT: 200 LBS

## 2020-12-10 DIAGNOSIS — M54.12 RADICULOPATHY, CERVICAL REGION: ICD-10-CM

## 2020-12-10 PROCEDURE — 99213 OFFICE O/P EST LOW 20 MIN: CPT

## 2020-12-10 NOTE — ASSESSMENT
[FreeTextEntry1] : Mr. Davis presents for follow-up with interval history as above. He has long-standing UE weakness and does not wish to consider surgical intervention at this time. Patient wishes to be evaluated by orthopedics for L > R shoulder complaints. Referral provided to patient. I would be happy to see the patient back on a prn basis. \par \par

## 2020-12-10 NOTE — DATA REVIEWED
[de-identified] :  CT of the cervical spine shows multilevel DDD and stenosis. \par MRI: MRI of the cervical spine was reviewed along with the official report. There is stenosis secondary to disk herniations at C3-4, 4-5, and 5-6 that are eccentric to the left.

## 2020-12-10 NOTE — PHYSICAL EXAM
[General Appearance - Alert] : alert [General Appearance - In No Acute Distress] : in no acute distress [General Appearance - Well Nourished] : well nourished [General Appearance - Well Developed] : well developed [Oriented To Time, Place, And Person] : oriented to person, place, and time [Impaired Insight] : insight and judgment were intact [Person] : oriented to person [Place] : oriented to place [Time] : oriented to time [Short Term Intact] : short term memory intact [Concentration Intact] : normal concentrating ability [Fluency] : fluency intact [Comprehension] : comprehension intact [Cranial Nerves Trigeminal (V)] : facial sensation intact symmetrically [Cranial Nerves Facial (VII)] : face symmetrical [Cranial Nerves Glossopharyngeal (IX)] : tongue and palate midline [Cranial Nerves Accessory (XI - Cranial And Spinal)] : head turning and shoulder shrug symmetric [Cranial Nerves Hypoglossal (XII)] : there was no tongue deviation with protrusion [Motor Tone] : muscle tone was normal in all four extremities [Motor Strength] : muscle strength was normal in all four extremities [Abnormal Walk] : normal gait [No Visual Abnormalities] : no visible abnormailities [Antalgic] : antalgic [Over the Past 2 Weeks, Have You Felt Down, Depressed, or Hopeless?] : 1.) Over the past 2 weeks, have you felt down, depressed, or hopeless? No [Over the Past 2 Weeks, Have You Felt Little Interest or Pleasure Doing Things?] : 2.) Over the past 2 weeks, have you felt little interest or pleasure doing things? No [FreeTextEntry8] : antalgic gait

## 2020-12-10 NOTE — REASON FOR VISIT
[Follow-Up: _____] : a [unfilled] follow-up visit [FreeTextEntry1] : Mr. Davis presents for follow up visit.  Last seen 9/2020.  Patient endorses neck/interscapular pain with intermittent radiation to bilateral upper extremities, L > R. Patient is unable to raise left arm past shoulder height. neck pain = UE pain. L > R UE pain. The pain is predominantly in the shoulder region. Denies any saddle anesthesias, intermittent urinary incontinence secondary to prostatectomy. He is ambulating independently. No balance issues. Patient states the pain is aggravated by lifting, and grabbing objects. It is improved with rest and modification of activities. Patient is under the care of Dr. Jordan, pain management. Past treatments include intra articular injections to the bilateral shoulders which assist with pain and mobility. He manages his symptoms with Baclofen and Oxycodone as needed. He states he had a UE EMG/NCS from 12/2019 reveals mild left carpal tunnel syndrome. Decreased recruitment of left arm proximally suggestive of chronic C5- C6 radiculopathy. \par \par

## 2020-12-21 PROBLEM — Z12.11 ENCOUNTER FOR SCREENING COLONOSCOPY: Status: RESOLVED | Noted: 2019-07-18 | Resolved: 2020-12-21

## 2021-01-06 ENCOUNTER — APPOINTMENT (OUTPATIENT)
Dept: PULMONOLOGY | Facility: CLINIC | Age: 66
End: 2021-01-06
Payer: MEDICARE

## 2021-01-06 VITALS
HEIGHT: 70 IN | DIASTOLIC BLOOD PRESSURE: 78 MMHG | SYSTOLIC BLOOD PRESSURE: 138 MMHG | OXYGEN SATURATION: 97 % | RESPIRATION RATE: 16 BRPM | WEIGHT: 199 LBS | BODY MASS INDEX: 28.49 KG/M2 | HEART RATE: 88 BPM

## 2021-01-06 DIAGNOSIS — J98.6 DISORDERS OF DIAPHRAGM: ICD-10-CM

## 2021-01-06 DIAGNOSIS — J98.4 OTHER DISORDERS OF LUNG: ICD-10-CM

## 2021-01-06 PROCEDURE — 99214 OFFICE O/P EST MOD 30 MIN: CPT

## 2021-01-06 RX ORDER — HYDROCORTISONE 1 %
12 CREAM (GRAM) TOPICAL
Qty: 400 | Refills: 0 | Status: DISCONTINUED | COMMUNITY
Start: 2016-11-17 | End: 2021-01-06

## 2021-01-06 RX ORDER — LISINOPRIL 2.5 MG/1
2.5 TABLET ORAL
Qty: 30 | Refills: 0 | Status: DISCONTINUED | COMMUNITY
Start: 2016-12-27 | End: 2021-01-06

## 2021-01-06 RX ORDER — POLYETHYLENE GLYOCOL 3350, SODIUM CHLORIDE, SODIUM BICARBONATE AND POTASSIUM CHLORIDE 420; 11.2; 5.72; 1.48 G/4L; G/4L; G/4L; G/4L
420 POWDER, FOR SOLUTION NASOGASTRIC; ORAL
Qty: 1 | Refills: 0 | Status: DISCONTINUED | COMMUNITY
Start: 2019-07-18 | End: 2021-01-06

## 2021-01-06 RX ORDER — OMEPRAZOLE 20 MG/1
20 CAPSULE, DELAYED RELEASE ORAL DAILY
Qty: 90 | Refills: 1 | Status: ACTIVE | COMMUNITY
Start: 2019-07-30

## 2021-01-06 RX ORDER — LISINOPRIL 30 MG/1
30 TABLET ORAL
Refills: 0 | Status: DISCONTINUED | COMMUNITY
End: 2021-01-06

## 2021-01-06 NOTE — PHYSICAL EXAM
[General Appearance - Well Developed] : well developed [General Appearance - Well Nourished] : well nourished [Normal Conjunctiva] : the conjunctiva exhibited no abnormalities [Eyelids - No Xanthelasma] : the eyelids demonstrated no xanthelasmas [Low Lying Soft Palate] : low lying soft palate [Elongated Uvula] : elongated uvula [II] : II [Neck Appearance] : the appearance of the neck was normal [Neck Cervical Mass (___cm)] : no neck mass was observed [Jugular Venous Distention Increased] : there was no jugular-venous distention [Thyroid Diffuse Enlargement] : the thyroid was not enlarged [Thyroid Nodule] : there were no palpable thyroid nodules [Heart Rate And Rhythm] : heart rate and rhythm were normal [Heart Sounds] : normal S1 and S2 [Murmurs] : no murmurs present [Abdomen Soft] : soft [Abdomen Tenderness] : non-tender [Abdomen Mass (___ Cm)] : no abdominal mass palpated [Abnormal Walk] : normal gait [Gait - Sufficient For Exercise Testing] : the gait was sufficient for exercise testing [Nail Clubbing] : no clubbing of the fingernails [Cyanosis, Localized] : no localized cyanosis [Petechial Hemorrhages (___cm)] : no petechial hemorrhages [Deep Tendon Reflexes (DTR)] : deep tendon reflexes were 2+ and symmetric [Sensation] : the sensory exam was normal to light touch and pinprick [No Focal Deficits] : no focal deficits [FreeTextEntry1] : communicative awake [Normal Rate] : the respiratory rate was normal [Normal Rhythm/Effort] : normal respiratory rhythm and effort [Decreased Breath Sounds] : breath sounds were decreased diffusely [Normal to Percussion] : the lungs were normal to percussion [Normal] : palpation of the chest was normal [Skin Color & Pigmentation] : normal skin color and pigmentation [Skin Turgor] : normal skin turgor [] : no rash

## 2021-01-06 NOTE — HISTORY OF PRESENT ILLNESS
[TextBox_4] : Patient last seen here about 3 years ago. He has a paralyzed right hemidiaphragm of unclear etiology. He may have some asthma. He's been on Breo feeling reasonably well.

## 2021-03-25 ENCOUNTER — RX RENEWAL (OUTPATIENT)
Age: 66
End: 2021-03-25

## 2021-06-16 ENCOUNTER — RX RENEWAL (OUTPATIENT)
Age: 66
End: 2021-06-16

## 2021-07-07 ENCOUNTER — APPOINTMENT (OUTPATIENT)
Dept: PULMONOLOGY | Facility: CLINIC | Age: 66
End: 2021-07-07

## 2021-07-12 ENCOUNTER — RX RENEWAL (OUTPATIENT)
Age: 66
End: 2021-07-12

## 2021-07-12 RX ORDER — FLUTICASONE FUROATE AND VILANTEROL TRIFENATATE 200; 25 UG/1; UG/1
200-25 POWDER RESPIRATORY (INHALATION)
Qty: 1 | Refills: 0 | Status: ACTIVE | COMMUNITY
Start: 2021-01-06 | End: 1900-01-01

## 2021-11-18 ENCOUNTER — EMERGENCY (EMERGENCY)
Facility: HOSPITAL | Age: 66
LOS: 1 days | Discharge: HOME | End: 2021-11-18
Attending: EMERGENCY MEDICINE
Payer: MEDICARE

## 2021-11-18 VITALS
OXYGEN SATURATION: 98 % | SYSTOLIC BLOOD PRESSURE: 122 MMHG | HEIGHT: 66 IN | HEART RATE: 110 BPM | TEMPERATURE: 98 F | WEIGHT: 199.96 LBS | RESPIRATION RATE: 22 BRPM | DIASTOLIC BLOOD PRESSURE: 86 MMHG

## 2021-11-18 VITALS
SYSTOLIC BLOOD PRESSURE: 115 MMHG | OXYGEN SATURATION: 96 % | DIASTOLIC BLOOD PRESSURE: 90 MMHG | RESPIRATION RATE: 18 BRPM | HEART RATE: 93 BPM

## 2021-11-18 LAB
ANION GAP SERPL CALC-SCNC: 12 MMOL/L — SIGNIFICANT CHANGE UP (ref 5–17)
APTT BLD: 47.9 SEC — HIGH (ref 27.5–35.5)
BUN SERPL-MCNC: 19.2 MG/DL — SIGNIFICANT CHANGE UP (ref 8–20)
CALCIUM SERPL-MCNC: 9.3 MG/DL — SIGNIFICANT CHANGE UP (ref 8.6–10.2)
CHLORIDE SERPL-SCNC: 99 MMOL/L — SIGNIFICANT CHANGE UP (ref 98–107)
CO2 SERPL-SCNC: 27 MMOL/L — SIGNIFICANT CHANGE UP (ref 22–29)
CREAT SERPL-MCNC: 1.12 MG/DL — SIGNIFICANT CHANGE UP (ref 0.5–1.3)
GLUCOSE SERPL-MCNC: 90 MG/DL — SIGNIFICANT CHANGE UP (ref 70–99)
HCT VFR BLD CALC: 38.1 % — LOW (ref 39–50)
HGB BLD-MCNC: 11.9 G/DL — LOW (ref 13–17)
INR BLD: 3.16 RATIO — HIGH (ref 0.88–1.16)
MCHC RBC-ENTMCNC: 29.9 PG — SIGNIFICANT CHANGE UP (ref 27–34)
MCHC RBC-ENTMCNC: 31.2 GM/DL — LOW (ref 32–36)
MCV RBC AUTO: 95.7 FL — SIGNIFICANT CHANGE UP (ref 80–100)
PLATELET # BLD AUTO: 206 K/UL — SIGNIFICANT CHANGE UP (ref 150–400)
POTASSIUM SERPL-MCNC: 4.4 MMOL/L — SIGNIFICANT CHANGE UP (ref 3.5–5.3)
POTASSIUM SERPL-SCNC: 4.4 MMOL/L — SIGNIFICANT CHANGE UP (ref 3.5–5.3)
PROTHROM AB SERPL-ACNC: 34.7 SEC — HIGH (ref 10.6–13.6)
RBC # BLD: 3.98 M/UL — LOW (ref 4.2–5.8)
RBC # FLD: 13.8 % — SIGNIFICANT CHANGE UP (ref 10.3–14.5)
SODIUM SERPL-SCNC: 138 MMOL/L — SIGNIFICANT CHANGE UP (ref 135–145)
WBC # BLD: 4.87 K/UL — SIGNIFICANT CHANGE UP (ref 3.8–10.5)
WBC # FLD AUTO: 4.87 K/UL — SIGNIFICANT CHANGE UP (ref 3.8–10.5)

## 2021-11-18 PROCEDURE — 85610 PROTHROMBIN TIME: CPT

## 2021-11-18 PROCEDURE — 99284 EMERGENCY DEPT VISIT MOD MDM: CPT | Mod: 25

## 2021-11-18 PROCEDURE — 71101 X-RAY EXAM UNILAT RIBS/CHEST: CPT

## 2021-11-18 PROCEDURE — 85027 COMPLETE CBC AUTOMATED: CPT

## 2021-11-18 PROCEDURE — 93010 ELECTROCARDIOGRAM REPORT: CPT

## 2021-11-18 PROCEDURE — 93005 ELECTROCARDIOGRAM TRACING: CPT

## 2021-11-18 PROCEDURE — 80048 BASIC METABOLIC PNL TOTAL CA: CPT

## 2021-11-18 PROCEDURE — 85730 THROMBOPLASTIN TIME PARTIAL: CPT

## 2021-11-18 PROCEDURE — 36415 COLL VENOUS BLD VENIPUNCTURE: CPT

## 2021-11-18 PROCEDURE — 71101 X-RAY EXAM UNILAT RIBS/CHEST: CPT | Mod: 26

## 2021-11-18 PROCEDURE — 73030 X-RAY EXAM OF SHOULDER: CPT

## 2021-11-18 PROCEDURE — 73030 X-RAY EXAM OF SHOULDER: CPT | Mod: 26,LT

## 2021-11-18 PROCEDURE — 99284 EMERGENCY DEPT VISIT MOD MDM: CPT

## 2021-11-18 RX ORDER — ACETAMINOPHEN 500 MG
2 TABLET ORAL
Qty: 56 | Refills: 0
Start: 2021-11-18 | End: 2021-11-24

## 2021-11-18 RX ORDER — METHOCARBAMOL 500 MG/1
1500 TABLET, FILM COATED ORAL ONCE
Refills: 0 | Status: COMPLETED | OUTPATIENT
Start: 2021-11-18 | End: 2021-11-18

## 2021-11-18 RX ORDER — ACETAMINOPHEN 500 MG
650 TABLET ORAL ONCE
Refills: 0 | Status: COMPLETED | OUTPATIENT
Start: 2021-11-18 | End: 2021-11-18

## 2021-11-18 RX ORDER — METHOCARBAMOL 500 MG/1
2 TABLET, FILM COATED ORAL
Qty: 42 | Refills: 0
Start: 2021-11-18 | End: 2021-11-24

## 2021-11-18 RX ORDER — OMEPRAZOLE 10 MG/1
1 CAPSULE, DELAYED RELEASE ORAL
Qty: 0 | Refills: 0 | DISCHARGE

## 2021-11-18 RX ORDER — ACETAMINOPHEN 500 MG
975 TABLET ORAL ONCE
Refills: 0 | Status: COMPLETED | OUTPATIENT
Start: 2021-11-18 | End: 2021-11-18

## 2021-11-18 RX ORDER — LISINOPRIL 2.5 MG/1
1 TABLET ORAL
Qty: 0 | Refills: 0 | DISCHARGE

## 2021-11-18 RX ORDER — ALBUTEROL 90 UG/1
2 AEROSOL, METERED ORAL
Qty: 0 | Refills: 0 | DISCHARGE

## 2021-11-18 RX ORDER — METFORMIN HYDROCHLORIDE 850 MG/1
1 TABLET ORAL
Qty: 0 | Refills: 0 | DISCHARGE

## 2021-11-18 RX ORDER — CARVEDILOL PHOSPHATE 80 MG/1
1 CAPSULE, EXTENDED RELEASE ORAL
Qty: 0 | Refills: 0 | DISCHARGE

## 2021-11-18 RX ADMIN — Medication 650 MILLIGRAM(S): at 14:29

## 2021-11-18 NOTE — ED ADULT NURSE REASSESSMENT NOTE - NS ED NURSE REASSESS COMMENT FT1
pt arrives a&ox3, slow speech, reports he had a trip and fall yesterday on uneven pavement. c/o pain to left side of chest. arrives with cane, states he was just given the cane. noted Afib 93 bpm on CM. provided with call bell. pt watching TV, comfortable appearance.

## 2021-11-18 NOTE — ED PROVIDER NOTE - PATIENT PORTAL LINK FT
You can access the FollowMyHealth Patient Portal offered by [facility] by registering at the following website: [email or web address]. By joining [facility]’s FollowMyHealth portal, you will also be able to view your health information using other applications (apps) compatible with our system.

## 2021-11-18 NOTE — ED ADULT NURSE NOTE - OBJECTIVE STATEMENT
pt presents to ED s/p trip and fall over broken sidewalk. pt denies hitting head or LOC. pt complaining of L sided rib pain. pt only requesting xray and food. PIV placed. labs obtained. awaiting radiology. pt presents to ED s/p trip and fall over broken sidewalk. GCS 15, slurred speech at baseline as per pt. chronic L shoulder deformity noted. pt denies hitting head or LOC. pt complaining of L sided rib pain. pt only requesting xray and food. PIV placed. labs obtained. awaiting radiology. CM in place, HR 94 afib.

## 2021-11-18 NOTE — ED PROVIDER NOTE - PHYSICAL EXAMINATION
Const: Awake, alert and oriented. In no acute distress. Well appearing.  HEENT: NC/AT. No raccoon eyes, no delaney sign, no epistaxis, no septal hematoma, no intraoral lacerations or bleeding, no tongue lacerations or abrasions.  Eyes: No scleral icterus. EOMI.  Neck:. No midline TTP. No palpable step offs.  Chest: Chest wall stable, no flail chest, no crepitus on palpation. + TTP in the left axillary line without crepitus.  Cardiac: Regular rate and regular rhythm. +S1/S2. No murmurs. 2+ Central pulses and symmetric. Peripheral pulses 2+ and symmetric. No LE edema.  Resp: Speaking in full sentences. No evidence of respiratory distress. No wheezes, rales or rhonchi.  Abd: Soft, non-tender, non-distended. Normal bowel sounds in all 4 quadrants. No guarding or rebound.  MSK: Left shoulder with obvious deformity (chronic per patient). Pelvis stable. No obvious deformity. Full ROM bilateral hips. 5/5  strength, decreased ROM of the left shoulder.  Back: Spine midline and non-tender. No palpable step offs.  Skin: No rashes, abrasions or lacerations.  Neuro: Awake, alert & oriented x 3. GCS 15. Withdraws to pain symmetrically. Follows commands. 5/5 strength symmetrically all extremities. Mildly slurred speech (chronic per patient).

## 2021-11-18 NOTE — ED PROVIDER NOTE - OBJECTIVE STATEMENT
67 y/o M with PMH A fib (possibly on AC?), HTN, DM present s/p fall yesterday for left sided rib pain that he describes as "though  my ribs are fractured", now worsening to muscle spasms that he correlates with another patient being moved into the area next to him. He denies pain with deep inspiration, denies SOB, denies numbness or weakness. His fall yesterday was while walking on the side walk - states he had a trip and fall onto his left side, did not hit his head, was able to get up on his own and had no pain at the time. His pain started today, but he did take medication for pain last night. He denies numbness, weakness. He notes his left shoulder has a chronic deformity. He lives alone, but has multiple family members near by that can help him if needed. He states "I'm on a lot of medication. I didn't want to be here this long. I thought this would be quick - I just want an Xray." He denies allergies to medication.

## 2021-11-18 NOTE — ED PROVIDER NOTE - CLINICAL SUMMARY MEDICAL DECISION MAKING FREE TEXT BOX
67 y/o M with A fib (? blood thinner) presents for evaluation s/p mechanical fall yesterday. Will check labs, XR's, pain control and reassess.

## 2021-11-18 NOTE — ED PROVIDER NOTE - NS ED ROS FT
Const: Denies fever, chills  HEENT: Denies blurry vision, sore throat  Neck: Denies neck pain/stiffness  Resp: Denies coughing, SOB  Cardiovascular: Denies CP, palpitations, LE edema  GI: Denies nausea, vomiting, abdominal pain, diarrhea, constipation, blood in stool  : Denies urinary frequency/urgency/dysuria, hematuria  MSK: + left rib pain. Denies back pain  Neuro: Denies HA, dizziness, numbness, weakness  Skin: Denies rashes.

## 2021-11-18 NOTE — ED ADULT NURSE NOTE - NSIMPLEMENTINTERV_GEN_ALL_ED
Implemented All Fall with Harm Risk Interventions:  [place] to call system. Call bell, personal items and telephone within reach. Instruct patient to call for assistance. Room bathroom lighting operational. Non-slip footwear when patient is off stretcher. Physically safe environment: no spills, clutter or unnecessary equipment. Stretcher in lowest position, wheels locked, appropriate side rails in place. Provide visual cue, wrist band, yellow gown, etc. Monitor gait and stability. Monitor for mental status changes and reorient to person, place, and time. Review medications for side effects contributing to fall risk. Reinforce activity limits and safety measures with patient and family. Provide visual clues: red socks.

## 2021-11-18 NOTE — ED PROVIDER NOTE - PROGRESS NOTE DETAILS
[place]: patient very agitated, asked for food, which was given to the patient, but "not fast enough", upset that the med rec that was done was not fast enough, agitated that the xray is taking so long. Cursing at staff, pointing fingers and gesturing at staff. Security at bedside. When trying to talk to patient, he screams "fuck you!" but agrees to go to xray. [place]ca: Patient increasingly agitated, refusing to answer what pharmacy he uses, I shared results, security at bedside. line removed by RN with security, meds sent to pharmacy, can follow up with his PMD.

## 2021-11-18 NOTE — ED ADULT NURSE REASSESSMENT NOTE - REASSESS COMMUNICATION
pt arrives back from XR screaming yelling, cursing at staff. escorted by security back to bed. MD to bedside to update pt on POC.

## 2021-11-18 NOTE — ED ADULT NURSE REASSESSMENT NOTE - NS ED NURSE REASSESS COMMENT FT1
security called to beside and again while pts was in xray as pt extremely disrespectful towards staff, agitated and aggressive. xray obtained. cleared and d/c'd by MD. security at bedside for PIV removal for RN safety. pt escorted out by security. belongings returned.